# Patient Record
Sex: FEMALE | Race: WHITE
[De-identification: names, ages, dates, MRNs, and addresses within clinical notes are randomized per-mention and may not be internally consistent; named-entity substitution may affect disease eponyms.]

---

## 2020-07-14 ENCOUNTER — HOSPITAL ENCOUNTER (OUTPATIENT)
Dept: HOSPITAL 84 - D.LAB | Age: 40
Discharge: HOME | End: 2020-07-14
Attending: INTERNAL MEDICINE
Payer: COMMERCIAL

## 2020-07-14 VITALS — BODY MASS INDEX: 37.3 KG/M2

## 2020-07-14 DIAGNOSIS — R13.10: Primary | ICD-10-CM

## 2020-07-14 DIAGNOSIS — R10.13: ICD-10-CM

## 2020-07-14 DIAGNOSIS — R11.2: ICD-10-CM

## 2020-07-14 LAB
ERYTHROCYTE [DISTWIDTH] IN BLOOD BY AUTOMATED COUNT: 13.4 % (ref 11.5–14.5)
HCT VFR BLD CALC: 37 % (ref 36–48)
HGB BLD-MCNC: 12 G/DL (ref 12–16)
LYMPHOCYTES NFR BLD AUTO: 44.2 % (ref 15–50)
MCH RBC QN AUTO: 27.5 PG (ref 26–34)
MCHC RBC AUTO-ENTMCNC: 32.4 G/DL (ref 31–37)
MCV RBC: 84.9 FL (ref 80–100)
NEUTROPHILS NFR BLD AUTO: 50.5 % (ref 40–80)
PLATELET # BLD: 231 10X3/UL (ref 130–400)
PMV BLD AUTO: 11.2 FL (ref 7.4–10.4)
RBC # BLD AUTO: 4.36 10X6/UL (ref 4–5.4)
WBC # BLD AUTO: 7.6 10X3/UL (ref 4.8–10.8)

## 2020-07-20 ENCOUNTER — HOSPITAL ENCOUNTER (OUTPATIENT)
Dept: HOSPITAL 84 - D.OPS | Age: 40
Discharge: HOME | End: 2020-07-20
Attending: INTERNAL MEDICINE
Payer: COMMERCIAL

## 2020-07-20 VITALS — BODY MASS INDEX: 44.41 KG/M2 | HEIGHT: 68 IN | WEIGHT: 293 LBS

## 2020-07-20 VITALS — DIASTOLIC BLOOD PRESSURE: 63 MMHG | SYSTOLIC BLOOD PRESSURE: 116 MMHG

## 2020-07-20 DIAGNOSIS — R10.13: ICD-10-CM

## 2020-07-20 DIAGNOSIS — R11.2: ICD-10-CM

## 2020-07-20 DIAGNOSIS — K21.9: ICD-10-CM

## 2020-07-20 DIAGNOSIS — R13.10: Primary | ICD-10-CM

## 2020-07-20 LAB
ANION GAP SERPL CALC-SCNC: 11.2 MMOL/L (ref 8–16)
BASOPHILS NFR BLD AUTO: 0.3 % (ref 0–2)
BUN SERPL-MCNC: 13 MG/DL (ref 7–18)
CALCIUM SERPL-MCNC: 8.5 MG/DL (ref 8.5–10.1)
CHLORIDE SERPL-SCNC: 106 MMOL/L (ref 98–107)
CO2 SERPL-SCNC: 26.8 MMOL/L (ref 21–32)
CREAT SERPL-MCNC: 0.9 MG/DL (ref 0.6–1.3)
EOSINOPHIL NFR BLD: 2 % (ref 0–7)
ERYTHROCYTE [DISTWIDTH] IN BLOOD BY AUTOMATED COUNT: 13.7 % (ref 11.5–14.5)
GLUCOSE SERPL-MCNC: 101 MG/DL (ref 74–106)
HCT VFR BLD CALC: 36 % (ref 36–48)
HGB BLD-MCNC: 11.5 G/DL (ref 12–16)
IMM GRANULOCYTES NFR BLD: 0 % (ref 0–5)
LYMPHOCYTES NFR BLD AUTO: 44.1 % (ref 15–50)
MCH RBC QN AUTO: 27.6 PG (ref 26–34)
MCHC RBC AUTO-ENTMCNC: 31.9 G/DL (ref 31–37)
MCV RBC: 86.5 FL (ref 80–100)
MONOCYTES NFR BLD: 5 % (ref 2–11)
NEUTROPHILS NFR BLD AUTO: 48.6 % (ref 40–80)
OSMOLALITY SERPL CALC.SUM OF ELEC: 278 MOSM/KG (ref 275–300)
PLATELET # BLD: 223 10X3/UL (ref 130–400)
PMV BLD AUTO: 10.7 FL (ref 7.4–10.4)
POTASSIUM SERPL-SCNC: 4 MMOL/L (ref 3.5–5.1)
RBC # BLD AUTO: 4.16 10X6/UL (ref 4–5.4)
SODIUM SERPL-SCNC: 140 MMOL/L (ref 136–145)
WBC # BLD AUTO: 6.4 10X3/UL (ref 4.8–10.8)

## 2020-07-20 NOTE — NUR
1310-RECD TO ROOM FROM GI. ALERT, RESP WITH EASE.
1315- VOISE IN TO REPORT FINDINGS.
1325-UP TO BATHROOM, VOIDS FREELY. IV D/C.
1335-DISCHARGE INSTRUCTIONS REVIEWED.

## 2020-07-22 NOTE — OP
PATIENT NAME:  CRISTIN AKERS                             MEDICAL RECORD: [id number]
:[birth year]                                             LOCATION:D[facility]          
                                                         ADMISSION DATE:        
SURGEON:  SAIRA DOW DO             
 
 
DATE OF OPERATION:  2020
 
PROCEDURE:  EGD with biopsies.
 
INDICATIONS FOR PROCEDURE:  Dysphagia, GERD, nausea and vomiting, epigastric
pain.
 
SCOPE:  Olympus video gastroscope.
 
MEDICATIONS:  Propofol 300 mg IV per anesthesia.
 
ESTIMATED BLOOD LOSS:  Minimal.
 
COMPLICATIONS:  None.
 
FINDINGS:  Informed consent was given.  The patient was made comfortable with
the above medication.  After reaching an adequate level of sedation by slow IV
push, the patient was placed on her left side.  The endoscope was advanced under
direct visualization through the mouth to the second portion of the duodenum. 
The entire esophagus appeared normal down to the GE junction.  At the GE
junction, there were minor changes consistent with LA class A reflux-induced
esophagitis.  Cold forceps biopsies were taken from the mid esophagus to rule
out the presence of eosinophils.  The endoscope was advanced beyond the GE
junction into the stomach and retroflexed to view the cardia and fundus, which
appeared normal.  Throughout the distal body of the stomach as well as in the
antrum and prepyloric regions, there were some minor changes of erythema and
granularity consistent with mild chronic gastritis.  Cold forceps biopsies were
taken from the antrum and incisura to submit for histopathology and to rule out
the presence of H. pylori.  The endoscope was advanced beyond the pylorus into
the duodenum, which appeared normal to the second portion.  Cold forceps
biopsies were taken to submit for histopathology.  The endoscope was withdrawn
from the patient.  The patient tolerated the procedure well and there were no
complications.
 
IMPRESSION:
1.  LA class A reflux-induced esophagitis.
2.  Very mild chronic gastritis changes.
3.  Otherwise, normal esophagogastroduodenoscopy.
 
PLAN AND RECOMMENDATIONS:
1.  Discharge home when recovery parameters are met.
2.  Follow up biopsy specimen results.
3.  GERD diet and reflux precautions.
4.  Continue current proton pump inhibitor of Protonix 40 mg daily.
5.  Okay to continue Carafate as needed for breakthrough symptoms, which is how
the patient is currently using this medication with relief.
6.  Notify the GI clinic if symptoms worsen or fail to improve.
7.  Can consider adding an H2 blocker such as Pepcid or famotidine in the
evening if continued breakthrough symptoms.
8.  If dysphagia persists, consider esophageal manometry.
 
TRANSINT:[id number] Voice Confirmation ID: [id number] DOCUMENT ID: [id number]
 
 
 
OPERATIVE REPORT                               [id number]    CRISTIN AKERS,SAIRA OLIVA DO             
 
 
 
Electronically Signed by SAIRA STANLEY on 20 at 1126
 
 
 
 
 
 
 
 
 
 
 
 
 
 
 
 
 
 
 
 
 
 
 
 
 
 
 
 
 
 
 
 
 
 
 
 
 
 
 
 
 
CC:                                                             [id number]
DICTATION DATE: 20 1301     :     20 1845      [facility] 
                                                                      [address]
[place] [id number] [place] [id number]